# Patient Record
Sex: MALE | Race: WHITE
[De-identification: names, ages, dates, MRNs, and addresses within clinical notes are randomized per-mention and may not be internally consistent; named-entity substitution may affect disease eponyms.]

---
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
## 2019-04-04 ENCOUNTER — HOSPITAL ENCOUNTER (EMERGENCY)
Dept: HOSPITAL 80 - FED | Age: 19
Discharge: HOME | End: 2019-04-04
Payer: COMMERCIAL

## 2019-04-04 VITALS — SYSTOLIC BLOOD PRESSURE: 111 MMHG | DIASTOLIC BLOOD PRESSURE: 87 MMHG
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
## 2019-04-04 DIAGNOSIS — S60.352A: Primary | ICD-10-CM
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
## 2019-04-04 NOTE — EDPHY
HPI/HX/ROS/PE/MDM


Narrative: 





CHIEF COMPLAINT:  Fishing lure stuck in left thumb





HPI: The patient is a 19-year-old male who reports accidentally getting a 

fishing lower stuck in his left thumb just prior to arrival.  He complains of 

quite severe pain to this area but denies other injury.  Tetanus up to date per 

patient.





REVIEW OF SYSTEMS:


Aside from elements discussed in the HPI, a comprehensive 10-point review of 

systems was reviewed and is negative.





PMH:  None significant.





SOCIAL HISTORY:  Single.  Student.





PHYSICAL EXAM:


General:Patient is alert, in no acute distress.


Extremities:  A pronged fishing lower is imbedded in the patient's medial left 

thumb.  Distal capillary refills intact.  This does not involve joint.


Neuro: Oriented x3.  Normal motor function.  Normal sensory function.


ED Course: 





The wound was locally infiltrated with lidocaine/bupivacaine without 

epinephrine.  I was then able to push the jordyn through the skin and then cut it 

off with a .  The lure was then easily removed.  Neurovascular 

status remained normal following the procedure.





General


Time Seen by Provider: 04/04/19 19:32


Initial Vital Signs: 





 Initial Vital Signs











Temperature (C)  37.2 C   04/04/19 19:29


 


Heart Rate  89   04/04/19 19:29


 


Respiratory Rate  16   04/04/19 19:29


 


Blood Pressure  111/87 H  04/04/19 19:29


 


O2 Sat (%)  94   04/04/19 19:29








 











O2 Delivery Mode               Room Air














Allergies/Adverse Reactions: 


 





No Known Allergies Allergy (Unverified 04/04/19 19:28)


 








Home Medications: 














 Medication  Instructions  Recorded


 


NO HOME MEDICATIONS  05/28/11














Departure





- Departure


Disposition: Home, Routine, Self-Care


Clinical Impression: 


 Foreign body of finger





Condition: Good


Instructions:  Puncture Wound (ED)


Additional Instructions: 


Return to the Emergency Department for fever, redness, discharge from wound, 

increasing pain or other worsening of condition.